# Patient Record
Sex: FEMALE | Race: OTHER
[De-identification: names, ages, dates, MRNs, and addresses within clinical notes are randomized per-mention and may not be internally consistent; named-entity substitution may affect disease eponyms.]

---

## 2020-07-19 ENCOUNTER — HOSPITAL ENCOUNTER (EMERGENCY)
Dept: HOSPITAL 61 - ER | Age: 55
Discharge: HOME | End: 2020-07-19
Payer: SELF-PAY

## 2020-07-19 VITALS — DIASTOLIC BLOOD PRESSURE: 73 MMHG | SYSTOLIC BLOOD PRESSURE: 124 MMHG

## 2020-07-19 VITALS — HEIGHT: 60 IN | WEIGHT: 134.48 LBS | BODY MASS INDEX: 26.4 KG/M2

## 2020-07-19 DIAGNOSIS — R20.0: ICD-10-CM

## 2020-07-19 DIAGNOSIS — M54.5: Primary | ICD-10-CM

## 2020-07-19 DIAGNOSIS — Z88.0: ICD-10-CM

## 2020-07-19 PROCEDURE — 99284 EMERGENCY DEPT VISIT MOD MDM: CPT

## 2020-07-19 NOTE — PHYS DOC
General Adult


EDM:


Chief Complaint:  BACK PAIN OR INJURY





HPI:


HPI:





Patient is a 55  year old f p/w back pain


onset on the right side


hthree days ago


getting worse despite tylenol


She was told that she should do some leg exercises and so she was trying those 

with a seem to make the pain worse right side buttock pain radiates down past 

the knee shooting pain worse with movement and touching the buttock





last took pain medication at nine pm. tylenol


associated iwth right leg numbness








no problems urinating.


no pain or burning


no fever


no trauma.


No bowel or bladder incontinence


allergies: penicillin


Patient who is here from [facility] ([place], [place]) she cannot get back home due to the reports 

being close she is staying with her daughter.





pmh: no daily medications or medical problems


has had hysterectomy.





Review of Systems:


Review of Systems:


Constitutional:   Denies fever or chills. []


Eyes:   Denies change in visual acuity. []


HENT:   Denies nasal congestion or sore throat. [] 


Respiratory:   Denies cough or shortness of breath. [] 


Cardiovascular:   Denies chest pain or edema. [] 


GI:   Denies abdominal pain, nausea, vomiting, bloody stools or diarrhea. [] 


:  


Neurologic:   Denies headache, negative for trauma





Heart Score:


Risk Factors:


Risk Factors:  DM, Current or recent (<one month) smoker, HTN, HLP, family 

history of CAD, obesity.


Risk Scores:


Score 0 - 3:  2.5% MACE over next 6 weeks - Discharge Home


Score 4 - 6:  20.3% MACE over next 6 weeks - Admit for Clinical Observation


Score 7 - 10:  72.7% MACE over next 6 weeks - Early Invasive Strategies





Physical Exam:


PE:





Constitutional: Well developed, well nourished, no acute distress, non-toxic ramin

earance. []


HENT: Normocephalic, atraumatic, bilateral external ears normal, oropharynx 

moist, no oral exudates, nose normal. []


Eyes: PERRLA, EOMI, conjunctiva normal, no discharge. [] 


Neck: Normal range of motion, no tenderness, supple, no stridor. [] 


normal respiratory effort no increased work of breathing.


Abdomen: Bowel sounds normal, soft, no tenderness, no masses, no pulsatile 

masses. [] 


Skin: Warm, dry, no erythema, no rash. [] 


Back: ttp noted in the right buttock area just over the sciatic nerve as well as

 in the right paraspinous area no focal midline tenderness.


Extremities: No tenderness, no cyanosis, no clubbing, ROM intact, no edema. [] 


Neurologic: Alert and oriented X 3, normal motor function, normal sensory 

function, no focal deficits noted. [] Strength and sensation intact bilateral 

lower extremities


Psychologic: Affect normal, judgement normal, mood normal. []





Current Patient Data:


Vital Signs:


Vital signs are normal patient is afebrile see nurse's notes





EKG:


EKG:


[]





Radiology/Procedures:


Radiology/Procedures:


[]





Course & Med Decision Making:


Course & Med Decision Making


Pertinent Labs and Imaging studies reviewed. (See chart for details)





[] This is a 55-year-old female presenting with right back and buttock pain with

 shooting pain down through the leg pedal pulse and sensation and strength are 

all intact no blunt trauma I suspect sciatica patient was given Decadron and 

Norco in the emergency room with a short prescription for Norco recommended ice 

rest gradual return to activity return precautions discussed patient voiced 

understanding  was used throughout the ER course





Dragon Disclaimer:


Dragon Disclaimer:


This electronic medical record was generated, in whole or in part, using a voice

 recognition dictation system.





Departure


Departure


Impression:  


   Primary Impression:  


   Back pain


Disposition:  01 HOME, SELF-CARE


Condition:  STABLE


Patient Instructions:  Back Pain, Adult


Scripts


Docusate Sodium (COLACE) 100 Mg Capsule


1 CAP PO BID for 30 Days, #60 CAP 0 Refills


   Prov: KYLE COLON MD         7/19/20 


Hydrocodone/Apap 5-325 (NORCO 5-325 TABLET) 1 Each Tablet


1-2 EACH PO PRN Q6HRS PRN for PAIN, #15


   as needed for pain


   Prov: KYLE COLON MD         7/19/20





Justicifation of Admission Dx:


Justifications for Admission:


Justification of Admission Dx:  N/A











KYLE COLON MD            Jul 19, 2020 04:55